# Patient Record
Sex: MALE | Race: WHITE | URBAN - METROPOLITAN AREA
[De-identification: names, ages, dates, MRNs, and addresses within clinical notes are randomized per-mention and may not be internally consistent; named-entity substitution may affect disease eponyms.]

---

## 2017-05-30 ENCOUNTER — EMERGENCY (EMERGENCY)
Facility: HOSPITAL | Age: 32
LOS: 1 days | Discharge: HOME | End: 2017-05-30
Attending: EMERGENCY MEDICINE | Admitting: EMERGENCY MEDICINE
Payer: COMMERCIAL

## 2017-05-30 VITALS
OXYGEN SATURATION: 97 % | SYSTOLIC BLOOD PRESSURE: 132 MMHG | RESPIRATION RATE: 16 BRPM | DIASTOLIC BLOOD PRESSURE: 78 MMHG | HEART RATE: 54 BPM

## 2017-05-30 DIAGNOSIS — F17.200 NICOTINE DEPENDENCE, UNSPECIFIED, UNCOMPLICATED: ICD-10-CM

## 2017-05-30 DIAGNOSIS — T75.4XXA ELECTROCUTION, INITIAL ENCOUNTER: ICD-10-CM

## 2017-05-30 DIAGNOSIS — M79.602 PAIN IN LEFT ARM: ICD-10-CM

## 2017-05-30 PROCEDURE — 93010 ELECTROCARDIOGRAM REPORT: CPT | Mod: NC

## 2017-05-30 PROCEDURE — 99284 EMERGENCY DEPT VISIT MOD MDM: CPT | Mod: 25

## 2017-05-30 NOTE — ED PROVIDER NOTE - MEDICAL DECISION MAKING DETAILS
Electrical shock, no signs of dysthymia, no signs superficial or deep electrical burn, no pain, no neuro deficits. will discharge with return precautions and follow up with PMD.

## 2017-05-30 NOTE — ED PROVIDER NOTE - SKIN, MLM
Skin normal color for race, warm, dry and intact. No evidence of rash. Skin normal color for race, warm, dry and intact. No evidence of rash or burns

## 2017-05-30 NOTE — ED ADULT TRIAGE NOTE - CHIEF COMPLAINT QUOTE
Patient s/p electrical shock  100-200 V, no entry/exit wound noted Patient s/p electrical shock  100-200 V, no entry/exit wound or burns noted. Complaining of left arm tingling

## 2017-05-30 NOTE — ED PROVIDER NOTE - NS ED MD SCRIBE ATTENDING SCRIBE SECTIONS
PAST MEDICAL/SURGICAL/SOCIAL HISTORY/PROGRESS NOTE/INTAKE ASSESSMENT/SCREENINGS/RESULTS/CONSULTATIONS/SHIFT CHANGE/PHYSICAL EXAM/HIV/VITAL SIGNS( Pullset)/HISTORY OF PRESENT ILLNESS/REVIEW OF SYSTEMS/DISPOSITION

## 2017-05-30 NOTE — ED ADULT NURSE NOTE - CHIEF COMPLAINT QUOTE
Patient s/p electrical shock  100-200 V, no entry/exit wound or burns noted. Complaining of left arm tingling

## 2017-05-30 NOTE — ED PROVIDER NOTE - OBJECTIVE STATEMENT
33 yo M w/ Hx of GERD c/o tingling in left hand tracking up left arm into chest with tightness s/p electric shock today at work. 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 Pt did not touch a socket, did not lock up, was not blown back, no LOC, no visual changes 33 yo M w/ Hx of GERD c/o tingling in left hand tracking up left arm into chest with tightness s/p electric shock today at work. 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 Pt did not touch a socket, did not lock up, was not blown back, no LOC, no visual changes
